# Patient Record
Sex: FEMALE | Race: WHITE | NOT HISPANIC OR LATINO | Employment: FULL TIME | URBAN - METROPOLITAN AREA
[De-identification: names, ages, dates, MRNs, and addresses within clinical notes are randomized per-mention and may not be internally consistent; named-entity substitution may affect disease eponyms.]

---

## 2023-03-29 LAB
ANTI-CENTROMERE: <0.2 AI
ANTI-CHROMATIN: <0.2 AI
ANTI-DNA (DS): 1 IU/ML
ANTI-JO-1 IGG: <0.2 AI
ANTI-NUCLEAR ANTIBODY (ANA): NEGATIVE
ANTI-RIBOSOMAL P: <0.2 AI
ANTI-RNP: <0.2 AI
ANTI-SCL-70: 0.3 AI
ANTI-SM/RNP: <0.2 AI
ANTI-SM: <0.2 AI
ANTI-SSA: <0.2 AI
ANTI-SSB: <0.2 AI

## 2024-01-08 ENCOUNTER — APPOINTMENT (OUTPATIENT)
Dept: PRIMARY CARE | Facility: CLINIC | Age: 69
End: 2024-01-08
Payer: COMMERCIAL

## 2024-01-29 ENCOUNTER — OFFICE VISIT (OUTPATIENT)
Dept: PRIMARY CARE | Facility: CLINIC | Age: 69
End: 2024-01-29
Payer: COMMERCIAL

## 2024-01-29 VITALS
BODY MASS INDEX: 22.2 KG/M2 | WEIGHT: 130 LBS | DIASTOLIC BLOOD PRESSURE: 66 MMHG | TEMPERATURE: 97.2 F | SYSTOLIC BLOOD PRESSURE: 153 MMHG | HEART RATE: 72 BPM | OXYGEN SATURATION: 99 % | HEIGHT: 64 IN

## 2024-01-29 DIAGNOSIS — F32.A ANXIETY AND DEPRESSION: ICD-10-CM

## 2024-01-29 DIAGNOSIS — I73.00 RAYNAUD'S DISEASE WITHOUT GANGRENE: ICD-10-CM

## 2024-01-29 DIAGNOSIS — J45.20 MILD INTERMITTENT ASTHMA WITHOUT COMPLICATION (HHS-HCC): ICD-10-CM

## 2024-01-29 DIAGNOSIS — J01.00 ACUTE NON-RECURRENT MAXILLARY SINUSITIS: ICD-10-CM

## 2024-01-29 DIAGNOSIS — F41.9 ANXIETY AND DEPRESSION: ICD-10-CM

## 2024-01-29 DIAGNOSIS — R05.2 SUBACUTE COUGH: Primary | ICD-10-CM

## 2024-01-29 DIAGNOSIS — I10 HTN (HYPERTENSION), BENIGN: ICD-10-CM

## 2024-01-29 DIAGNOSIS — E55.9 VITAMIN D DEFICIENCY: ICD-10-CM

## 2024-01-29 PROCEDURE — 1126F AMNT PAIN NOTED NONE PRSNT: CPT | Performed by: FAMILY MEDICINE

## 2024-01-29 PROCEDURE — 3077F SYST BP >= 140 MM HG: CPT | Performed by: FAMILY MEDICINE

## 2024-01-29 PROCEDURE — 1159F MED LIST DOCD IN RCRD: CPT | Performed by: FAMILY MEDICINE

## 2024-01-29 PROCEDURE — 3078F DIAST BP <80 MM HG: CPT | Performed by: FAMILY MEDICINE

## 2024-01-29 PROCEDURE — 99212 OFFICE O/P EST SF 10 MIN: CPT | Performed by: FAMILY MEDICINE

## 2024-01-29 PROCEDURE — 1036F TOBACCO NON-USER: CPT | Performed by: FAMILY MEDICINE

## 2024-01-29 RX ORDER — CHLORHEXIDINE GLUCONATE ORAL RINSE 1.2 MG/ML
SOLUTION DENTAL
COMMUNITY
Start: 2023-06-06 | End: 2024-01-29 | Stop reason: WASHOUT

## 2024-01-29 RX ORDER — TRAZODONE HYDROCHLORIDE 50 MG/1
TABLET ORAL
COMMUNITY
End: 2024-01-29 | Stop reason: WASHOUT

## 2024-01-29 RX ORDER — ELECTROLYTES/DEXTROSE
5 SOLUTION, ORAL ORAL DAILY
COMMUNITY
Start: 2016-02-22

## 2024-01-29 RX ORDER — NYSTATIN AND TRIAMCINOLONE ACETONIDE 100000; 1 [USP'U]/G; MG/G
OINTMENT TOPICAL
COMMUNITY
Start: 2022-09-20

## 2024-01-29 RX ORDER — ALBUTEROL SULFATE 90 UG/1
2 AEROSOL, METERED RESPIRATORY (INHALATION) EVERY 4 HOURS PRN
COMMUNITY
Start: 2023-03-24

## 2024-01-29 RX ORDER — AZELASTINE 1 MG/ML
1 SPRAY, METERED NASAL 2 TIMES DAILY
COMMUNITY
Start: 2021-09-01 | End: 2024-01-29 | Stop reason: WASHOUT

## 2024-01-29 RX ORDER — BENZONATATE 200 MG/1
200 CAPSULE ORAL 3 TIMES DAILY PRN
Qty: 90 CAPSULE | Refills: 0 | Status: SHIPPED | OUTPATIENT
Start: 2024-01-29 | End: 2024-02-28

## 2024-01-29 RX ORDER — FLUCONAZOLE 200 MG/1
200 TABLET ORAL
COMMUNITY

## 2024-01-29 RX ORDER — CELECOXIB 200 MG/1
CAPSULE ORAL
COMMUNITY
End: 2024-01-29 | Stop reason: WASHOUT

## 2024-01-29 RX ORDER — ACETAMINOPHEN 500 MG
5000 TABLET ORAL
COMMUNITY

## 2024-01-29 RX ORDER — DICLOFENAC SODIUM 10 MG/G
2 GEL TOPICAL EVERY 8 HOURS PRN
COMMUNITY
Start: 2022-05-23

## 2024-01-29 RX ORDER — FLUOROURACIL 50 MG/G
CREAM TOPICAL
COMMUNITY
Start: 2023-09-26

## 2024-01-29 RX ORDER — TRIAMCINOLONE ACETONIDE 1 MG/G
CREAM TOPICAL
COMMUNITY
Start: 2022-09-20 | End: 2024-01-29 | Stop reason: WASHOUT

## 2024-01-29 RX ORDER — AMOXICILLIN AND CLAVULANATE POTASSIUM 875; 125 MG/1; MG/1
875 TABLET, FILM COATED ORAL 2 TIMES DAILY
Qty: 20 TABLET | Refills: 0 | Status: SHIPPED | OUTPATIENT
Start: 2024-01-29 | End: 2024-02-08

## 2024-01-29 RX ORDER — ESCITALOPRAM OXALATE 5 MG/1
5 TABLET ORAL DAILY
COMMUNITY

## 2024-01-29 RX ORDER — CLOTRIMAZOLE 10 MG/1
LOZENGE ORAL; TOPICAL
COMMUNITY
End: 2024-01-29 | Stop reason: WASHOUT

## 2024-01-29 ASSESSMENT — PATIENT HEALTH QUESTIONNAIRE - PHQ9
1. LITTLE INTEREST OR PLEASURE IN DOING THINGS: NOT AT ALL
2. FEELING DOWN, DEPRESSED OR HOPELESS: NOT AT ALL
SUM OF ALL RESPONSES TO PHQ9 QUESTIONS 1 AND 2: 0

## 2024-01-29 NOTE — PROGRESS NOTES
Patient is here still with some cough and drainage from sinus pressure.  As she originally had strep and the flu a and then got COVID.  The last was the flu a about 2 weeks ago but she still been having some drainage goes away sometimes with some cough medicine and some cough drops but still keeps draining.    REVIEW OF SYSTEMS: 12 systems negative except for those mentioned in the HPI.    PHYSICAL EXAMINATION:   Constitutional: The patient is alert, in no acute  distress.  Eyes: Extraocular movements are intact. Pupils are equal and reactive to light  ENT: Edema and pain in V2 on the left with boggy nasal turbinates.  Trace fluid in the TMs  Neck: Supple without lymphadenopathy or JVD.  Heart: Regular rate and rhythm without murmur, click, gallop, or rub.  Lungs: Clear to auscultation bilaterally. No rales, rhonchi, or  wheezing.  Psychiatric: Good judgment and insight. Normal affect and mood.  Lymphatic: as noted above.  Skin: no rashes or lesions    Assessment:  per EMR    Plan:  Will switch over to Augmentin.  Can also use Delsym and Tessalon Perles.  Can follow-up if not improved  This dictation was created using Dragon dictation and may contain errors

## 2024-03-21 ENCOUNTER — OFFICE VISIT (OUTPATIENT)
Dept: PRIMARY CARE | Facility: CLINIC | Age: 69
End: 2024-03-21
Payer: COMMERCIAL

## 2024-03-21 VITALS
SYSTOLIC BLOOD PRESSURE: 142 MMHG | OXYGEN SATURATION: 99 % | DIASTOLIC BLOOD PRESSURE: 82 MMHG | HEIGHT: 64 IN | TEMPERATURE: 97.3 F | WEIGHT: 127 LBS | HEART RATE: 67 BPM | BODY MASS INDEX: 21.68 KG/M2

## 2024-03-21 DIAGNOSIS — F41.9 ANXIETY AND DEPRESSION: ICD-10-CM

## 2024-03-21 DIAGNOSIS — E55.9 VITAMIN D DEFICIENCY: ICD-10-CM

## 2024-03-21 DIAGNOSIS — J45.20 MILD INTERMITTENT ASTHMA WITHOUT COMPLICATION (HHS-HCC): ICD-10-CM

## 2024-03-21 DIAGNOSIS — Z00.00 MEDICARE ANNUAL WELLNESS VISIT, SUBSEQUENT: Primary | ICD-10-CM

## 2024-03-21 DIAGNOSIS — I73.00 RAYNAUD'S DISEASE WITHOUT GANGRENE: ICD-10-CM

## 2024-03-21 DIAGNOSIS — F32.A ANXIETY AND DEPRESSION: ICD-10-CM

## 2024-03-21 DIAGNOSIS — I10 HTN (HYPERTENSION), BENIGN: ICD-10-CM

## 2024-03-21 DIAGNOSIS — R73.9 HYPERGLYCEMIA: ICD-10-CM

## 2024-03-21 DIAGNOSIS — R74.8 ELEVATED LIVER ENZYMES: ICD-10-CM

## 2024-03-21 PROCEDURE — 3077F SYST BP >= 140 MM HG: CPT | Performed by: FAMILY MEDICINE

## 2024-03-21 PROCEDURE — G0439 PPPS, SUBSEQ VISIT: HCPCS | Performed by: FAMILY MEDICINE

## 2024-03-21 PROCEDURE — 1036F TOBACCO NON-USER: CPT | Performed by: FAMILY MEDICINE

## 2024-03-21 PROCEDURE — 1159F MED LIST DOCD IN RCRD: CPT | Performed by: FAMILY MEDICINE

## 2024-03-21 PROCEDURE — G0444 DEPRESSION SCREEN ANNUAL: HCPCS | Performed by: FAMILY MEDICINE

## 2024-03-21 PROCEDURE — 1123F ACP DISCUSS/DSCN MKR DOCD: CPT | Performed by: FAMILY MEDICINE

## 2024-03-21 PROCEDURE — G0296 VISIT TO DETERM LDCT ELIG: HCPCS | Performed by: FAMILY MEDICINE

## 2024-03-21 PROCEDURE — 3079F DIAST BP 80-89 MM HG: CPT | Performed by: FAMILY MEDICINE

## 2024-03-21 PROCEDURE — 1158F ADVNC CARE PLAN TLK DOCD: CPT | Performed by: FAMILY MEDICINE

## 2024-03-21 PROCEDURE — G0446 INTENS BEHAVE THER CARDIO DX: HCPCS | Performed by: FAMILY MEDICINE

## 2024-03-21 PROCEDURE — 99497 ADVNCD CARE PLAN 30 MIN: CPT | Performed by: FAMILY MEDICINE

## 2024-03-21 RX ORDER — CELECOXIB 200 MG/1
200 CAPSULE ORAL
COMMUNITY
Start: 2024-02-02

## 2024-03-21 RX ORDER — METFORMIN HYDROCHLORIDE 500 MG/1
TABLET ORAL
COMMUNITY
Start: 2018-04-19 | End: 2024-03-21 | Stop reason: WASHOUT

## 2024-03-21 ASSESSMENT — PATIENT HEALTH QUESTIONNAIRE - PHQ9
1. LITTLE INTEREST OR PLEASURE IN DOING THINGS: NOT AT ALL
SUM OF ALL RESPONSES TO PHQ9 QUESTIONS 1 AND 2: 0
2. FEELING DOWN, DEPRESSED OR HOPELESS: NOT AT ALL

## 2024-03-21 ASSESSMENT — ENCOUNTER SYMPTOMS
LOSS OF SENSATION IN FEET: 0
OCCASIONAL FEELINGS OF UNSTEADINESS: 0

## 2024-03-21 NOTE — PROGRESS NOTES
Advance Care Planning Note     Discussion Date: 03/21/24   Discussion Participants: patient    The patient wishes to discuss Advance Care Planning today and the following is a brief summary of our discussion.     Patient has capacity to make their own medical decisions: Yes  Health Care Agent/Surrogate Decision Maker documented in chart: Yes    Documents on file and valid:  Advance Directive/Living Will: Yes   Health Care Power of : Yes  Other: daughter    Communication of Medical Status/Prognosis:   na     Communication of Treatment Goals/Options:   na     Treatment Decisions  Goals of Care: survival is paramount regardless of prognosis, treatment outcome, or burden   duaghter  Follow Up Plan  na  Team Members  na  Time Statement: Total face to face time spent on advance care planning was >15 minutes with 16 minutes spent in counseling, including the explanation.    Chintan SUMNER Doloreshortensia, DO  3/21/2024 8:33 AMPatient was identified as a fall risk. Risk prevention instructions provided.  Patient is here for annual.  She has been doing well currently with no issues.  She has seen a dermatologist as well as also someone for the depression and has been doing well.  She has not had calcium score test and did smoke for more than 20 years.  Her ASCVD risk score is 9.2% she did have a heart cath 3 years ago for concerning EKG.  Otherwise is doing well.    REVIEW OF SYSTEMS: 12 systems negative except for those mentioned in the HPI. Reviewed home risks with patient. Patient feels safe at home.    PHYSICAL EXAMINATION:   Constitutional: The patient is alert and oriented x3, in no acute  distress.  Eyes: Extraocular movements are intact. Pupils are equal and reactive to light  ENT: Bilateral TMs within normal limits. Nasal turbinates are within normal limits. Throat within normal limits.  Neck: Supple without lymphadenopathy or JVD.  Heart: Regular rate and rhythm without murmur, click, gallop, or rub.  Lungs: Clear to  auscultation bilaterally. No rales, rhonchi, or  wheezing.  Abdomen: Soft, nontender, nondistended. Normoactive bowel sounds.   No palpable masses. Normal percussion  Musculoskeletal: 5/5 motor, upper and lower extremities.  Neurologic: Cranial nerves II through XII fully intact. +2/4 DTRs  in ankle and knee.  Psychiatric: Good judgment and insight. Normal affect and mood. No cognitive defects noted.  Lymphatic: Negative as noted above.  Skin: no rashes or lesions    Assessment:  Per EMR    Plan:  Cardiac screening as well as lung screening with ASCVD risk score discussed above 9.2% she does qualify for calcium score test for both of these.  PHQ-9 is reviewed.  Otherwise patient is doing excellent we will follow-up in 1 year  Discussed with the patient and reviewed annual wellness questionnaire form as well as cognitive deficits. Also discussed with the patient immunizations and risks for colonoscopy and other screening procedures    This dictation was created using Dragon dictation and may contain errors

## 2024-03-23 PROBLEM — M53.3 SACROILIAC JOINT DYSFUNCTION OF RIGHT SIDE: Status: RESOLVED | Noted: 2020-05-04 | Resolved: 2024-03-23

## 2024-03-23 PROBLEM — N83.8 OVARIAN MASS, RIGHT: Status: RESOLVED | Noted: 2024-03-23 | Resolved: 2024-03-23

## 2024-03-23 PROBLEM — Z00.00 MEDICARE ANNUAL WELLNESS VISIT, SUBSEQUENT: Status: RESOLVED | Noted: 2024-03-21 | Resolved: 2024-03-23

## 2024-03-23 PROBLEM — G43.109 OCULAR MIGRAINE: Status: ACTIVE | Noted: 2024-03-23

## 2024-03-23 PROBLEM — J30.9 ALLERGIC RHINITIS: Status: ACTIVE | Noted: 2024-03-23

## 2024-03-23 PROBLEM — J01.00 ACUTE NON-RECURRENT MAXILLARY SINUSITIS: Status: RESOLVED | Noted: 2024-01-29 | Resolved: 2024-03-23

## 2024-03-23 PROBLEM — J45.909 REACTIVE AIRWAY DISEASE (HHS-HCC): Status: ACTIVE | Noted: 2019-09-25

## 2024-03-23 PROBLEM — L43.8 ORAL LICHEN PLANUS: Status: ACTIVE | Noted: 2024-03-23

## 2024-03-23 PROBLEM — E78.49 FAMILIAL HYPERLIPIDEMIA: Status: ACTIVE | Noted: 2024-03-23

## 2024-03-23 PROBLEM — N39.3 SUI (STRESS URINARY INCONTINENCE, FEMALE): Status: ACTIVE | Noted: 2024-03-13

## 2024-03-23 PROBLEM — R05.2 SUBACUTE COUGH: Status: RESOLVED | Noted: 2024-01-29 | Resolved: 2024-03-23

## 2024-03-25 ENCOUNTER — OFFICE VISIT (OUTPATIENT)
Dept: RHEUMATOLOGY | Facility: CLINIC | Age: 69
End: 2024-03-25
Payer: COMMERCIAL

## 2024-03-25 VITALS
OXYGEN SATURATION: 99 % | SYSTOLIC BLOOD PRESSURE: 139 MMHG | WEIGHT: 126.2 LBS | HEIGHT: 63 IN | BODY MASS INDEX: 22.36 KG/M2 | TEMPERATURE: 97.3 F | HEART RATE: 66 BPM | DIASTOLIC BLOOD PRESSURE: 59 MMHG

## 2024-03-25 DIAGNOSIS — I73.00 RAYNAUD'S DISEASE WITHOUT GANGRENE: Primary | ICD-10-CM

## 2024-03-25 DIAGNOSIS — Z23 ENCOUNTER FOR IMMUNIZATION: ICD-10-CM

## 2024-03-25 PROCEDURE — 90750 HZV VACC RECOMBINANT IM: CPT | Performed by: INTERNAL MEDICINE

## 2024-03-25 PROCEDURE — 3075F SYST BP GE 130 - 139MM HG: CPT | Performed by: INTERNAL MEDICINE

## 2024-03-25 PROCEDURE — 90471 IMMUNIZATION ADMIN: CPT | Performed by: INTERNAL MEDICINE

## 2024-03-25 PROCEDURE — 99214 OFFICE O/P EST MOD 30 MIN: CPT | Performed by: INTERNAL MEDICINE

## 2024-03-25 PROCEDURE — 1159F MED LIST DOCD IN RCRD: CPT | Performed by: INTERNAL MEDICINE

## 2024-03-25 PROCEDURE — 1036F TOBACCO NON-USER: CPT | Performed by: INTERNAL MEDICINE

## 2024-03-25 PROCEDURE — 3078F DIAST BP <80 MM HG: CPT | Performed by: INTERNAL MEDICINE

## 2024-03-25 ASSESSMENT — PATIENT HEALTH QUESTIONNAIRE - PHQ9
2. FEELING DOWN, DEPRESSED OR HOPELESS: NOT AT ALL
SUM OF ALL RESPONSES TO PHQ9 QUESTIONS 1 & 2: 0
1. LITTLE INTEREST OR PLEASURE IN DOING THINGS: NOT AT ALL

## 2024-03-25 ASSESSMENT — ENCOUNTER SYMPTOMS
SHORTNESS OF BREATH: 0
MYALGIAS: 0
JOINT SWELLING: 0
NUMBNESS: 0
COLOR CHANGE: 1
FATIGUE: 1
WEAKNESS: 0
FEVER: 0
BACK PAIN: 0
COUGH: 0
ARTHRALGIAS: 0

## 2024-03-25 NOTE — ASSESSMENT & PLAN NOTE
No signs of a secondary process on exam.   AMOL ordered.   Pt to continue OTC measures to control her symptoms.  Pt at low risk of transitioning to another autoimmune disease

## 2024-03-25 NOTE — LETTER
March 25, 2024     Chintan Jorge DO  [address]  [facility] [address]    Patient: Macie Groves   YOB: 1955   Date of Visit: 3/25/2024       Dear Dr. Chintan Jorge DO:    Thank you for referring Macie Groves to me for evaluation. Below are my notes for this consultation.  If you have questions, please do not hesitate to call me. I look forward to following your patient along with you.       Sincerely,     Elysia Alaniz MD      CC: No Recipients  ______________________________________________________________________________________    RHEUMATOLOGY/PRIMARY CARE PROGRESS NOTE  Macie Groves 68 y.o. female  Chief Complaint   Patient presents with   • Follow-up     Raynauds       SUBJECTIVE  Re:  Raynaud's  Pt reports she has been stable.   Mild attacks but nothing severe and nothing prolonged.  Not noticing any new symptoms--no worsening joint pain, no rashes.    Since last seen, has had multiple infections--strep, flu and COVID.  Has gotten oral yeast infections on and off since  Reports she did have an irregular heart rate after a COVID booster.  Had a heart cath and was ok  (chart reviewed--this happened in 2021)     Pt is interested in getting shingles vaccination today      Patient Active Problem List    Diagnosis Date Noted   • Allergic rhinitis 03/23/2024   • Oral lichen planus 03/23/2024   • Ocular migraine 03/23/2024   • Familial hyperlipidemia 03/23/2024   • Elevated alkaline phosphatase level 03/21/2024   • Hyperglycemia 03/21/2024   • JESE (stress urinary incontinence, female) 03/13/2024   • HTN (hypertension), benign 01/29/2024   • Anxiety and depression 01/29/2024   • Raynaud's disease without gangrene 01/29/2024   • Vitamin D deficiency 01/29/2024   • Mild intermittent asthma without complication 01/29/2024   • Reactive airway disease 09/25/2019     Past Medical History:   Diagnosis Date   • Acute non-recurrent maxillary sinusitis 01/29/2024   • Allergy,  "unspecified, initial encounter 06/21/2022    Allergic reaction, initial encounter   • Caffeine use    • Other allergic rhinitis 01/17/2020    Non-seasonal allergic rhinitis, unspecified trigger   • Ovarian mass, right 03/23/2024   • Partial intestinal obstruction, unspecified as to cause (CMS/HCC)     Partial small bowel obstruction   • Sacroiliac joint dysfunction of right side 05/04/2020   • Subacute cough 01/29/2024     Current Outpatient Medications   Medication Instructions   • albuterol 90 mcg/actuation inhaler 2 puffs, inhalation, Every 4 hours PRN   • biotin 5 mg capsule 1 capsule (5 mg) once daily.   • celecoxib (CeleBREX) 200 mg capsule 1 capsule (200 mg). PRN   • cholecalciferol (VITAMIN D-3) 5,000 Units, oral, Daily RT   • diclofenac sodium (VOLTAREN) 2 g, Topical, Every 8 hours PRN   • escitalopram (LEXAPRO) 5 mg, oral, Daily   • fluconazole (DIFLUCAN) 200 mg, oral, 2x a week    • fluorouracil (Efudex) 5 % cream PRN   • nystatin-triamcinolone (Mycolog II) ointment PRN     Allergies   Allergen Reactions   • Ciprofloxacin Other     Dizziness   • Ezetimibe Other     Generic Zetia causes muscle aches   • Prednisone Hives   • Methocarbamol Itching and Rash     Review of Systems   Constitutional:  Positive for fatigue. Negative for fever.   Respiratory:  Negative for cough and shortness of breath.    Cardiovascular:  Negative for leg swelling.   Musculoskeletal:  Negative for arthralgias, back pain, gait problem, joint swelling and myalgias.   Skin:  Positive for color change. Negative for rash.   Neurological:  Negative for weakness and numbness.       PHYSICAL EXAM  /59   Pulse 66   Temp 36.3 °C (97.3 °F) (Temporal)   Ht 1.6 m (5' 3\")   Wt 57.2 kg (126 lb 3.2 oz)   SpO2 99%   BMI 22.36 kg/m²   Physical Exam  Vitals reviewed.   Constitutional:       General: She is not in acute distress.     Appearance: Normal appearance.   HENT:      Head: Normocephalic and atraumatic.   Eyes:      " Conjunctiva/sclera: Conjunctivae normal.   Cardiovascular:      Rate and Rhythm: Normal rate and regular rhythm.      Pulses: Normal pulses.      Heart sounds: Normal heart sounds. No murmur heard.     No friction rub. No gallop.   Pulmonary:      Effort: Pulmonary effort is normal. No respiratory distress.   Musculoskeletal:         General: No swelling, tenderness or deformity.      Cervical back: Normal range of motion and neck supple.      Right lower leg: No edema.      Left lower leg: No edema.      Comments: No synovitis of examined joints   Skin:     General: Skin is dry.      Coloration: Skin is not pale.      Findings: No erythema or rash.      Comments: No Raynaud's attacks   Neurological:      General: No focal deficit present.      Mental Status: She is alert and oriented to person, place, and time. Mental status is at baseline.      Gait: Gait normal.   Psychiatric:         Mood and Affect: Mood normal.       Lab Results   Component Value Date    WBC 6.3 10/17/2021    HGB 13.9 10/17/2021    HCT 42.2 10/17/2021    MCV 95 10/17/2021     10/17/2021     Lab Results   Component Value Date    GLUCOSE 97 08/10/2022    CALCIUM 9.0 08/10/2022     08/10/2022    K 4.7 08/10/2022    CO2 28 08/10/2022     08/10/2022    BUN 15 08/10/2022    CREATININE 0.68 08/10/2022     Lab Results   Component Value Date    ALT 14 08/10/2022    AST 22 08/10/2022    ALKPHOS 79 08/10/2022    BILITOT 0.6 08/10/2022         Assessment/plan  Problem List Items Addressed This Visit       Raynaud's disease without gangrene - Primary    Current Assessment & Plan     No signs of a secondary process on exam.   AMOL ordered.   Pt to continue OTC measures to control her symptoms.  Pt at low risk of transitioning to another autoimmune disease         Relevant Orders    AMOL with Reflex to NATHALIE     Other Visit Diagnoses       Encounter for immunization        Relevant Orders    Zoster vaccine, recombinant, adult (SHINGRIX)         Vaccinations discussed.  Pt to proceed with zoster vaccination today    Follow up: ___12__months

## 2024-03-25 NOTE — PROGRESS NOTES
RHEUMATOLOGY/PRIMARY CARE PROGRESS NOTE  Mcaie Luzsunny 68 y.o. female  Chief Complaint   Patient presents with    Follow-up     Raynauds       SUBJECTIVE  Re:  Raynaud's  Pt reports she has been stable.   Mild attacks but nothing severe and nothing prolonged.  Not noticing any new symptoms--no worsening joint pain, no rashes.    Since last seen, has had multiple infections--strep, flu and COVID.  Has gotten oral yeast infections on and off since  Reports she did have an irregular heart rate after a COVID booster.  Had a heart cath and was ok  (chart reviewed--this happened in 2021)     Pt is interested in getting shingles vaccination today      Patient Active Problem List    Diagnosis Date Noted    Allergic rhinitis 03/23/2024    Oral lichen planus 03/23/2024    Ocular migraine 03/23/2024    Familial hyperlipidemia 03/23/2024    Elevated alkaline phosphatase level 03/21/2024    Hyperglycemia 03/21/2024    JESE (stress urinary incontinence, female) 03/13/2024    HTN (hypertension), benign 01/29/2024    Anxiety and depression 01/29/2024    Raynaud's disease without gangrene 01/29/2024    Vitamin D deficiency 01/29/2024    Mild intermittent asthma without complication 01/29/2024    Reactive airway disease 09/25/2019     Past Medical History:   Diagnosis Date    Acute non-recurrent maxillary sinusitis 01/29/2024    Allergy, unspecified, initial encounter 06/21/2022    Allergic reaction, initial encounter    Caffeine use     Other allergic rhinitis 01/17/2020    Non-seasonal allergic rhinitis, unspecified trigger    Ovarian mass, right 03/23/2024    Partial intestinal obstruction, unspecified as to cause (CMS/[facility])     Partial small bowel obstruction    Sacroiliac joint dysfunction of right side 05/04/2020    Subacute cough 01/29/2024     Current Outpatient Medications   Medication Instructions    albuterol 90 mcg/actuation inhaler 2 puffs, inhalation, Every 4 hours PRN    biotin 5 mg capsule 1 capsule (5 mg) once  "daily.    celecoxib (CeleBREX) 200 mg capsule 1 capsule (200 mg). PRN    cholecalciferol (VITAMIN D-3) 5,000 Units, oral, Daily RT    diclofenac sodium (VOLTAREN) 2 g, Topical, Every 8 hours PRN    escitalopram (LEXAPRO) 5 mg, oral, Daily    fluconazole (DIFLUCAN) 200 mg, oral, 2x a week     fluorouracil (Efudex) 5 % cream PRN    nystatin-triamcinolone (Mycolog II) ointment PRN     Allergies   Allergen Reactions    Ciprofloxacin Other     Dizziness    Ezetimibe Other     Generic Zetia causes muscle aches    Prednisone Hives    Methocarbamol Itching and Rash     Review of Systems   Constitutional:  Positive for fatigue. Negative for fever.   Respiratory:  Negative for cough and shortness of breath.    Cardiovascular:  Negative for leg swelling.   Musculoskeletal:  Negative for arthralgias, back pain, gait problem, joint swelling and myalgias.   Skin:  Positive for color change. Negative for rash.   Neurological:  Negative for weakness and numbness.       PHYSICAL EXAM  /59   Pulse 66   Temp 36.3 °C (97.3 °F) (Temporal)   Ht 1.6 m (5' 3\")   Wt 57.2 kg (126 lb 3.2 oz)   SpO2 99%   BMI 22.36 kg/m²   Physical Exam  Vitals reviewed.   Constitutional:       General: She is not in acute distress.     Appearance: Normal appearance.   HENT:      Head: Normocephalic and atraumatic.   Eyes:      Conjunctiva/sclera: Conjunctivae normal.   Cardiovascular:      Rate and Rhythm: Normal rate and regular rhythm.      Pulses: Normal pulses.      Heart sounds: Normal heart sounds. No murmur heard.     No friction rub. No gallop.   Pulmonary:      Effort: Pulmonary effort is normal. No respiratory distress.   Musculoskeletal:         General: No swelling, tenderness or deformity.      Cervical back: Normal range of motion and neck supple.      Right lower leg: No edema.      Left lower leg: No edema.      Comments: No synovitis of examined joints   Skin:     General: Skin is dry.      Coloration: Skin is not pale.      " Findings: No erythema or rash.      Comments: No Raynaud's attacks   Neurological:      General: No focal deficit present.      Mental Status: She is alert and oriented to person, place, and time. Mental status is at baseline.      Gait: Gait normal.   Psychiatric:         Mood and Affect: Mood normal.       Lab Results   Component Value Date    WBC 6.3 10/17/2021    HGB 13.9 10/17/2021    HCT 42.2 10/17/2021    MCV 95 10/17/2021     10/17/2021     Lab Results   Component Value Date    GLUCOSE 97 08/10/2022    CALCIUM 9.0 08/10/2022     08/10/2022    K 4.7 08/10/2022    CO2 28 08/10/2022     08/10/2022    BUN 15 08/10/2022    CREATININE 0.68 08/10/2022     Lab Results   Component Value Date    ALT 14 08/10/2022    AST 22 08/10/2022    ALKPHOS 79 08/10/2022    BILITOT 0.6 08/10/2022         Assessment/plan  Problem List Items Addressed This Visit       Raynaud's disease without gangrene - Primary    Current Assessment & Plan     No signs of a secondary process on exam.   AMOL ordered.   Pt to continue OTC measures to control her symptoms.  Pt at low risk of transitioning to another autoimmune disease         Relevant Orders    AMOL with Reflex to NATHALIE     Other Visit Diagnoses       Encounter for immunization        Relevant Orders    Zoster vaccine, recombinant, adult (SHINGRIX)        Vaccinations discussed.  Pt to proceed with zoster vaccination today    Follow up: ___12__months

## 2024-03-25 NOTE — PATIENT INSTRUCTIONS
It was a pleasure to see you today  Please call if your symptoms worsen  Please review your summary for education and reminders.  Follow up at your next appointment.    If you had labs/xrays done today, you will be able to view on [employer].   We will contact you when the results are reviewed for further discussion.  Please note that you may receive your results before I have had a chance to review.  Please know I will be contacting you for discussion  Homegoing instructions for all patient  A healthy lifestyle helps chronic diseases  These are all the goals you should strive to improve your overall health   Blood pressure <130/85   BMI of <30 or waist circumference that is 1/2 of your height   Fasting blood sugar <107 (if you are diabetic, aim for an A1c <6.4%_   LDL cholesterol <130   Avoid smoking   Manage your stress   Get your preventive exams   Get your immunizations

## 2024-03-29 ENCOUNTER — LAB (OUTPATIENT)
Dept: LAB | Facility: LAB | Age: 69
End: 2024-03-29
Payer: COMMERCIAL

## 2024-03-29 DIAGNOSIS — I10 HTN (HYPERTENSION), BENIGN: ICD-10-CM

## 2024-03-29 DIAGNOSIS — E55.9 VITAMIN D DEFICIENCY: ICD-10-CM

## 2024-03-29 DIAGNOSIS — R73.9 HYPERGLYCEMIA: ICD-10-CM

## 2024-03-29 DIAGNOSIS — R74.8 ELEVATED LIVER ENZYMES: ICD-10-CM

## 2024-03-29 DIAGNOSIS — F32.A ANXIETY AND DEPRESSION: ICD-10-CM

## 2024-03-29 DIAGNOSIS — I73.00 RAYNAUD'S DISEASE WITHOUT GANGRENE: ICD-10-CM

## 2024-03-29 DIAGNOSIS — Z00.00 MEDICARE ANNUAL WELLNESS VISIT, SUBSEQUENT: ICD-10-CM

## 2024-03-29 DIAGNOSIS — F41.9 ANXIETY AND DEPRESSION: ICD-10-CM

## 2024-03-29 DIAGNOSIS — J45.20 MILD INTERMITTENT ASTHMA WITHOUT COMPLICATION (HHS-HCC): ICD-10-CM

## 2024-03-29 LAB
25(OH)D3 SERPL-MCNC: 87 NG/ML (ref 30–100)
ALBUMIN SERPL BCP-MCNC: 3.9 G/DL (ref 3.4–5)
ALP SERPL-CCNC: 95 U/L (ref 33–136)
ALT SERPL W P-5'-P-CCNC: 11 U/L (ref 7–45)
AMYLASE SERPL-CCNC: 47 U/L (ref 29–103)
ANION GAP SERPL CALC-SCNC: 10 MMOL/L (ref 10–20)
AST SERPL W P-5'-P-CCNC: 21 U/L (ref 9–39)
BASOPHILS # BLD AUTO: 0.06 X10*3/UL (ref 0–0.1)
BASOPHILS NFR BLD AUTO: 1.4 %
BILIRUB SERPL-MCNC: 0.4 MG/DL (ref 0–1.2)
BUN SERPL-MCNC: 14 MG/DL (ref 6–23)
CALCIUM SERPL-MCNC: 9 MG/DL (ref 8.6–10.3)
CHLORIDE SERPL-SCNC: 102 MMOL/L (ref 98–107)
CHOLEST SERPL-MCNC: 225 MG/DL (ref 0–199)
CHOLESTEROL/HDL RATIO: 3.4
CO2 SERPL-SCNC: 30 MMOL/L (ref 21–32)
CREAT SERPL-MCNC: 0.7 MG/DL (ref 0.5–1.05)
EGFRCR SERPLBLD CKD-EPI 2021: >90 ML/MIN/1.73M*2
EOSINOPHIL # BLD AUTO: 0.09 X10*3/UL (ref 0–0.7)
EOSINOPHIL NFR BLD AUTO: 2.1 %
ERYTHROCYTE [DISTWIDTH] IN BLOOD BY AUTOMATED COUNT: 13.1 % (ref 11.5–14.5)
EST. AVERAGE GLUCOSE BLD GHB EST-MCNC: 94 MG/DL
GLUCOSE SERPL-MCNC: 86 MG/DL (ref 74–99)
HBA1C MFR BLD: 4.9 %
HCT VFR BLD AUTO: 41.1 % (ref 36–46)
HDLC SERPL-MCNC: 65.6 MG/DL
HGB BLD-MCNC: 13.4 G/DL (ref 12–16)
IMM GRANULOCYTES # BLD AUTO: 0.01 X10*3/UL (ref 0–0.7)
IMM GRANULOCYTES NFR BLD AUTO: 0.2 % (ref 0–0.9)
LDLC SERPL CALC-MCNC: 143 MG/DL
LIPASE SERPL-CCNC: 179 U/L (ref 9–82)
LYMPHOCYTES # BLD AUTO: 1.43 X10*3/UL (ref 1.2–4.8)
LYMPHOCYTES NFR BLD AUTO: 32.6 %
MCH RBC QN AUTO: 31.6 PG (ref 26–34)
MCHC RBC AUTO-ENTMCNC: 32.6 G/DL (ref 32–36)
MCV RBC AUTO: 97 FL (ref 80–100)
MONOCYTES # BLD AUTO: 0.46 X10*3/UL (ref 0.1–1)
MONOCYTES NFR BLD AUTO: 10.5 %
NEUTROPHILS # BLD AUTO: 2.34 X10*3/UL (ref 1.2–7.7)
NEUTROPHILS NFR BLD AUTO: 53.2 %
NON HDL CHOLESTEROL: 159 MG/DL (ref 0–149)
NRBC BLD-RTO: 0 /100 WBCS (ref 0–0)
PLATELET # BLD AUTO: 260 X10*3/UL (ref 150–450)
POTASSIUM SERPL-SCNC: 4.3 MMOL/L (ref 3.5–5.3)
PROT SERPL-MCNC: 7.2 G/DL (ref 6.4–8.2)
RBC # BLD AUTO: 4.24 X10*6/UL (ref 4–5.2)
SODIUM SERPL-SCNC: 138 MMOL/L (ref 136–145)
TRIGL SERPL-MCNC: 80 MG/DL (ref 0–149)
TSH SERPL-ACNC: 1.53 MIU/L (ref 0.44–3.98)
VLDL: 16 MG/DL (ref 0–40)
WBC # BLD AUTO: 4.4 X10*3/UL (ref 4.4–11.3)

## 2024-03-29 PROCEDURE — 36415 COLL VENOUS BLD VENIPUNCTURE: CPT

## 2024-03-29 PROCEDURE — 86038 ANTINUCLEAR ANTIBODIES: CPT

## 2024-03-29 PROCEDURE — 83690 ASSAY OF LIPASE: CPT

## 2024-03-29 PROCEDURE — 84443 ASSAY THYROID STIM HORMONE: CPT

## 2024-03-29 PROCEDURE — 80061 LIPID PANEL: CPT

## 2024-03-29 PROCEDURE — 85025 COMPLETE CBC W/AUTO DIFF WBC: CPT

## 2024-03-29 PROCEDURE — 82150 ASSAY OF AMYLASE: CPT

## 2024-03-29 PROCEDURE — 80053 COMPREHEN METABOLIC PANEL: CPT

## 2024-03-29 PROCEDURE — 83036 HEMOGLOBIN GLYCOSYLATED A1C: CPT

## 2024-03-29 PROCEDURE — 82306 VITAMIN D 25 HYDROXY: CPT

## 2024-04-01 LAB — ANA SER QL HEP2 SUBST: NEGATIVE

## 2024-04-18 ENCOUNTER — TELEPHONE (OUTPATIENT)
Dept: PRIMARY CARE | Facility: CLINIC | Age: 69
End: 2024-04-18
Payer: COMMERCIAL

## 2024-04-18 NOTE — TELEPHONE ENCOUNTER
Pt called and said she was billed $165 for discussing end of life care with Dr Jorge. I think I remember hearing that there was a billing/coding issue with this.  She called billing and they instructed her to contact Dr Jorge regarding this

## 2024-07-01 ENCOUNTER — APPOINTMENT (OUTPATIENT)
Dept: PRIMARY CARE | Facility: CLINIC | Age: 69
End: 2024-07-01
Payer: COMMERCIAL

## 2024-07-01 DIAGNOSIS — R05.2 SUBACUTE COUGH: ICD-10-CM

## 2024-07-01 DIAGNOSIS — Z23 ENCOUNTER FOR IMMUNIZATION: Primary | ICD-10-CM

## 2024-07-01 PROCEDURE — 90750 HZV VACC RECOMBINANT IM: CPT | Performed by: INTERNAL MEDICINE

## 2024-07-01 PROCEDURE — 90471 IMMUNIZATION ADMIN: CPT | Performed by: INTERNAL MEDICINE

## 2024-09-04 ENCOUNTER — OFFICE VISIT (OUTPATIENT)
Dept: PRIMARY CARE | Facility: CLINIC | Age: 69
End: 2024-09-04
Payer: COMMERCIAL

## 2024-09-04 VITALS
DIASTOLIC BLOOD PRESSURE: 76 MMHG | HEIGHT: 63 IN | SYSTOLIC BLOOD PRESSURE: 135 MMHG | WEIGHT: 127 LBS | BODY MASS INDEX: 22.5 KG/M2 | HEART RATE: 64 BPM

## 2024-09-04 DIAGNOSIS — S46.002A INJURY OF LEFT ROTATOR CUFF, INITIAL ENCOUNTER: Primary | ICD-10-CM

## 2024-09-04 PROCEDURE — 3008F BODY MASS INDEX DOCD: CPT | Performed by: FAMILY MEDICINE

## 2024-09-04 PROCEDURE — 3075F SYST BP GE 130 - 139MM HG: CPT | Performed by: FAMILY MEDICINE

## 2024-09-04 PROCEDURE — 1124F ACP DISCUSS-NO DSCNMKR DOCD: CPT | Performed by: FAMILY MEDICINE

## 2024-09-04 PROCEDURE — 1159F MED LIST DOCD IN RCRD: CPT | Performed by: FAMILY MEDICINE

## 2024-09-04 PROCEDURE — 3078F DIAST BP <80 MM HG: CPT | Performed by: FAMILY MEDICINE

## 2024-09-04 PROCEDURE — 99213 OFFICE O/P EST LOW 20 MIN: CPT | Performed by: FAMILY MEDICINE

## 2024-09-04 RX ORDER — TRETINOIN 0.25 MG/G
GEL TOPICAL
COMMUNITY

## 2024-09-04 ASSESSMENT — ENCOUNTER SYMPTOMS
FEVER: 0
DIZZINESS: 0
SHORTNESS OF BREATH: 0
FATIGUE: 0
HEADACHES: 0
ACTIVITY CHANGE: 0

## 2024-09-04 ASSESSMENT — PATIENT HEALTH QUESTIONNAIRE - PHQ9
2. FEELING DOWN, DEPRESSED OR HOPELESS: NOT AT ALL
1. LITTLE INTEREST OR PLEASURE IN DOING THINGS: NOT AT ALL
SUM OF ALL RESPONSES TO PHQ9 QUESTIONS 1 AND 2: 0

## 2024-09-04 NOTE — PROGRESS NOTES
"Subjective   Patient ID: Macie Groves is a 68 y.o. female who presents for Consult (Left shoulder and arm pain since June. Hurts to sleep and put clothing on and for reach for items that are high).    Shoulder pain L   - reports she has been dealing with pain into her arm/shoulder for the last 3 months   - has been trying to do exercise to improve pain without significant benefit   - denies any significant weakness, numbness or tingling   - hurts worse when she lifts her arm up over her head behind her back or laying on her side   - denies any significant tenderness to palpation   - denies any significant trauma, fall, pop or traumatic event   - in the past she ha had R sided injury that required physical therapy for improvement          Review of Systems   Constitutional:  Negative for activity change, fatigue and fever.   Respiratory:  Negative for shortness of breath.    Cardiovascular:  Negative for chest pain.   Neurological:  Negative for dizziness and headaches.       Objective   /76   Pulse 64   Ht 1.6 m (5' 3\")   Wt 57.6 kg (127 lb)   BMI 22.50 kg/m²     Physical Exam  Constitutional:       Appearance: Normal appearance.   Musculoskeletal:      Comments: Reduced internal rotation, positive empty can sign, positive scarf sign    Neurological:      Mental Status: She is alert.         Assessment/Plan   Problem List Items Addressed This Visit    None  Visit Diagnoses         Codes    Injury of left rotator cuff, initial encounter    -  Primary S46.002A    stable   - continue celebrex 200mg daily   - physical therapy referral placed   - f/u 6 weeks     Relevant Orders    Referral to Physical Therapy               "

## 2024-09-04 NOTE — LETTER
To Whom It May Concern:     Ms. Macie Groves is a patient under my care for her chronic medical issues.  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  Her chronic pain issues would be exacerbated to the point that she would not be able to function properly in a court room setting.      If you have any further questions or concerns feel free to call my office.       Sincerely,         Dr. Alfa Ho

## 2024-09-04 NOTE — LETTER
September 4, 2024    Macie Groves  [address] [place] [address]      To Whom It May Concern:     Ms. Macie Groves is a patient under my care for her chronic medical issues.  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  Her chronic pain issues would be exacerbated to the point that she would not be able to function properly in a court room setting.      If you have any further questions or concerns feel free to call my office.       Sincerely,             Alfa Ho MD        CC: No Recipients

## 2024-09-09 ENCOUNTER — PATIENT MESSAGE (OUTPATIENT)
Dept: PRIMARY CARE | Facility: CLINIC | Age: 69
End: 2024-09-09
Payer: COMMERCIAL

## 2024-09-09 DIAGNOSIS — S46.002A INJURY OF LEFT ROTATOR CUFF, INITIAL ENCOUNTER: Primary | ICD-10-CM

## 2024-11-11 ENCOUNTER — APPOINTMENT (OUTPATIENT)
Dept: PRIMARY CARE | Facility: CLINIC | Age: 69
End: 2024-11-11
Payer: COMMERCIAL

## 2024-11-15 PROBLEM — M62.838 SPASM OF MUSCLE: Status: ACTIVE | Noted: 2024-04-10

## 2024-11-15 PROBLEM — E78.49 FAMILIAL COMBINED HYPERLIPIDEMIA: Status: ACTIVE | Noted: 2024-03-23

## 2024-11-18 ENCOUNTER — APPOINTMENT (OUTPATIENT)
Dept: PRIMARY CARE | Facility: CLINIC | Age: 69
End: 2024-11-18
Payer: COMMERCIAL

## 2024-11-18 VITALS
HEART RATE: 58 BPM | WEIGHT: 126 LBS | DIASTOLIC BLOOD PRESSURE: 80 MMHG | HEIGHT: 63 IN | SYSTOLIC BLOOD PRESSURE: 100 MMHG | BODY MASS INDEX: 22.32 KG/M2

## 2024-11-18 DIAGNOSIS — M25.512 CHRONIC LEFT SHOULDER PAIN: Primary | ICD-10-CM

## 2024-11-18 DIAGNOSIS — G89.29 CHRONIC LEFT SHOULDER PAIN: Primary | ICD-10-CM

## 2024-11-18 PROCEDURE — 90662 IIV NO PRSV INCREASED AG IM: CPT | Performed by: FAMILY MEDICINE

## 2024-11-18 PROCEDURE — 90471 IMMUNIZATION ADMIN: CPT | Performed by: FAMILY MEDICINE

## 2024-11-18 PROCEDURE — 99213 OFFICE O/P EST LOW 20 MIN: CPT | Performed by: FAMILY MEDICINE

## 2024-11-18 PROCEDURE — 3008F BODY MASS INDEX DOCD: CPT | Performed by: FAMILY MEDICINE

## 2024-11-18 PROCEDURE — 3074F SYST BP LT 130 MM HG: CPT | Performed by: FAMILY MEDICINE

## 2024-11-18 PROCEDURE — 1036F TOBACCO NON-USER: CPT | Performed by: FAMILY MEDICINE

## 2024-11-18 PROCEDURE — 3079F DIAST BP 80-89 MM HG: CPT | Performed by: FAMILY MEDICINE

## 2024-11-18 PROCEDURE — 1123F ACP DISCUSS/DSCN MKR DOCD: CPT | Performed by: FAMILY MEDICINE

## 2024-11-18 PROCEDURE — 1159F MED LIST DOCD IN RCRD: CPT | Performed by: FAMILY MEDICINE

## 2024-11-18 ASSESSMENT — ENCOUNTER SYMPTOMS
ACTIVITY CHANGE: 0
DIZZINESS: 0
FATIGUE: 0
SHORTNESS OF BREATH: 0
HEADACHES: 0
FEVER: 0

## 2024-11-18 NOTE — PROGRESS NOTES
"Subjective   Patient ID: Macie Groves is a 69 y.o. female who presents for Follow-up (Shoulder pt states it is better but not good pt requests a flu shot).    Shoulder pain   - reports she has been doing physical therapy for the last 8 weeks   - still having persistent pain in the L shoulder   - reports in the morning the pain is severe, and it improves as the day goes on   - reports pain can be sharp or dull   - range of motion is limited to abduction to 90 degrees   - no numbness, tingling or weakness   - has not had any traumatic episodes prior to this   - denies any popping, clicking or freezing in the shoulder        Review of Systems   Constitutional:  Negative for activity change, fatigue and fever.   Respiratory:  Negative for shortness of breath.    Cardiovascular:  Negative for chest pain.   Neurological:  Negative for dizziness and headaches.       Objective   /80   Pulse 58   Ht 1.6 m (5' 3\")   Wt 57.2 kg (126 lb)   BMI 22.32 kg/m²     Physical Exam  Constitutional:       Appearance: Normal appearance.   Cardiovascular:      Rate and Rhythm: Normal rate and regular rhythm.   Pulmonary:      Effort: Pulmonary effort is normal.      Breath sounds: Normal breath sounds.   Neurological:      Mental Status: She is alert.       Assessment/Plan   Problem List Items Addressed This Visit    None  Visit Diagnoses         Codes    Chronic left shoulder pain    -  Primary M25.512, G89.29    stable  - referral placed to ortho and pain management   - continue home PT exercises   - f/u with specialist     Relevant Orders    Referral to Orthopaedic Surgery    Referral to Pain Medicine               "

## 2025-03-24 ENCOUNTER — APPOINTMENT (OUTPATIENT)
Dept: PRIMARY CARE | Facility: CLINIC | Age: 70
End: 2025-03-24
Payer: COMMERCIAL

## 2025-03-25 ENCOUNTER — APPOINTMENT (OUTPATIENT)
Dept: PRIMARY CARE | Facility: CLINIC | Age: 70
End: 2025-03-25
Payer: COMMERCIAL

## 2025-03-25 VITALS
WEIGHT: 126 LBS | HEART RATE: 78 BPM | HEIGHT: 63 IN | BODY MASS INDEX: 22.32 KG/M2 | DIASTOLIC BLOOD PRESSURE: 62 MMHG | SYSTOLIC BLOOD PRESSURE: 130 MMHG

## 2025-03-25 DIAGNOSIS — Z00.00 ROUTINE GENERAL MEDICAL EXAMINATION AT A HEALTH CARE FACILITY: Primary | ICD-10-CM

## 2025-03-25 PROCEDURE — 1170F FXNL STATUS ASSESSED: CPT | Performed by: FAMILY MEDICINE

## 2025-03-25 PROCEDURE — 3078F DIAST BP <80 MM HG: CPT | Performed by: FAMILY MEDICINE

## 2025-03-25 PROCEDURE — 3075F SYST BP GE 130 - 139MM HG: CPT | Performed by: FAMILY MEDICINE

## 2025-03-25 PROCEDURE — 1159F MED LIST DOCD IN RCRD: CPT | Performed by: FAMILY MEDICINE

## 2025-03-25 PROCEDURE — 99397 PER PM REEVAL EST PAT 65+ YR: CPT | Performed by: FAMILY MEDICINE

## 2025-03-25 PROCEDURE — 1124F ACP DISCUSS-NO DSCNMKR DOCD: CPT | Performed by: FAMILY MEDICINE

## 2025-03-25 PROCEDURE — 3008F BODY MASS INDEX DOCD: CPT | Performed by: FAMILY MEDICINE

## 2025-03-25 ASSESSMENT — ACTIVITIES OF DAILY LIVING (ADL)
TAKING_MEDICATION: INDEPENDENT
BATHING: INDEPENDENT
MANAGING_FINANCES: INDEPENDENT
DRESSING: INDEPENDENT
GROCERY_SHOPPING: INDEPENDENT
DOING_HOUSEWORK: INDEPENDENT

## 2025-03-25 ASSESSMENT — ENCOUNTER SYMPTOMS
DIZZINESS: 0
UNEXPECTED WEIGHT CHANGE: 0
FEVER: 0
NERVOUS/ANXIOUS: 0
NAUSEA: 0
HEADACHES: 0
SHORTNESS OF BREATH: 0
POLYDIPSIA: 0
CONSTIPATION: 0
CHILLS: 0
SLEEP DISTURBANCE: 0
VOMITING: 0
ACTIVITY CHANGE: 0
DIARRHEA: 0
FATIGUE: 0

## 2025-03-25 NOTE — PROGRESS NOTES
"Subjective   Patient ID: Macie Groves is a 69 y.o. female who presents for Annual Exam.    Adult Health Exam   - reports she has generally been doing well, no major concerns   - has been trying to eat well, and has been trying to stay active as much as possible   - does deal with chronic back pain issues, and has been dealing with them   - sleeps well, has no problems falling asleep or staying asleep   - does feel well rested after a good nights sleep, occasionally will nap   - does follow with pulmonologist for lung nodules   - no major changes to the personal health over the last year   - non-smoker, former smoker but quit 30 years ago, does not drink alcohol, no other drug use   - working with physical therapy to rehabilitate the shoulder   - does follow with OBGYN for PAP and mammogram   - last colonoscopy done in 2023        Review of Systems   Constitutional:  Negative for activity change, chills, fatigue, fever and unexpected weight change.   Eyes:  Negative for visual disturbance.   Respiratory:  Negative for shortness of breath.    Cardiovascular:  Negative for chest pain.   Gastrointestinal:  Negative for constipation, diarrhea, nausea and vomiting.   Endocrine: Negative for polydipsia and polyuria.   Neurological:  Negative for dizziness and headaches.   Psychiatric/Behavioral:  Negative for sleep disturbance. The patient is not nervous/anxious.        Objective   /62   Pulse 78   Ht 1.6 m (5' 3\")   Wt 57.2 kg (126 lb)   BMI 22.32 kg/m²     Physical Exam  Constitutional:       Appearance: Normal appearance.   HENT:      Right Ear: Tympanic membrane normal.      Left Ear: Tympanic membrane normal.      Nose: Nose normal.      Mouth/Throat:      Mouth: Mucous membranes are moist.      Pharynx: Oropharynx is clear.   Eyes:      Pupils: Pupils are equal, round, and reactive to light.   Cardiovascular:      Rate and Rhythm: Normal rate and regular rhythm.   Pulmonary:      Effort: Pulmonary " effort is normal. No respiratory distress.      Breath sounds: Normal breath sounds.   Abdominal:      General: Abdomen is flat. Bowel sounds are normal.   Musculoskeletal:         General: No swelling or tenderness. Normal range of motion.      Cervical back: Normal range of motion.   Skin:     General: Skin is warm.      Capillary Refill: Capillary refill takes less than 2 seconds.   Neurological:      General: No focal deficit present.      Mental Status: She is alert and oriented to person, place, and time. Mental status is at baseline.   Psychiatric:         Mood and Affect: Mood normal.         Thought Content: Thought content normal.         Assessment/Plan   Problem List Items Addressed This Visit    None  Visit Diagnoses         Codes    Routine general medical examination at a health care facility    -  Primary Z00.00    Relevant Orders    CBC and Auto Differential    Comprehensive metabolic panel    Lipid panel    Hemoglobin A1c    Vitamin D 25-Hydroxy,Total (for eval of Vitamin D levels)                minutes on the discharge service.

## 2025-03-25 NOTE — LETTER
Macie Groves  1955    3/25/2025    To Whom This May Concern:    My patient, Macie Groves, is unable to perform Jury Duty due to a mental or physical condition that renders the prospective juror unfit for jury service for the remainder of the jury year.    Should you have any questions please do not hesitate to call.    Thank you for your cooperation.    Sincerely,    Alfa Ho MD

## 2025-03-30 PROBLEM — J45.20 MILD INTERMITTENT ASTHMA WITHOUT COMPLICATION (HHS-HCC): Status: ACTIVE | Noted: 2019-09-25

## 2025-03-30 PROBLEM — M75.02 ADHESIVE CAPSULITIS OF LEFT SHOULDER: Status: ACTIVE | Noted: 2025-02-14

## 2025-03-30 PROBLEM — M62.838 SPASM OF MUSCLE: Status: RESOLVED | Noted: 2024-04-10 | Resolved: 2025-03-30

## 2025-03-30 PROBLEM — M75.122 NONTRAUMATIC COMPLETE TEAR OF LEFT ROTATOR CUFF: Status: ACTIVE | Noted: 2025-03-30

## 2025-03-31 ENCOUNTER — APPOINTMENT (OUTPATIENT)
Dept: RHEUMATOLOGY | Facility: CLINIC | Age: 70
End: 2025-03-31
Payer: COMMERCIAL

## 2025-03-31 VITALS
HEART RATE: 69 BPM | SYSTOLIC BLOOD PRESSURE: 141 MMHG | OXYGEN SATURATION: 99 % | HEIGHT: 63 IN | TEMPERATURE: 97.6 F | DIASTOLIC BLOOD PRESSURE: 64 MMHG | WEIGHT: 126.8 LBS | BODY MASS INDEX: 22.47 KG/M2

## 2025-03-31 DIAGNOSIS — I73.00 RAYNAUD'S DISEASE WITHOUT GANGRENE: Primary | ICD-10-CM

## 2025-03-31 DIAGNOSIS — J45.20 MILD INTERMITTENT ASTHMA WITHOUT COMPLICATION (HHS-HCC): ICD-10-CM

## 2025-03-31 DIAGNOSIS — K12.1 ORAL ULCER: ICD-10-CM

## 2025-03-31 PROCEDURE — 1123F ACP DISCUSS/DSCN MKR DOCD: CPT | Performed by: INTERNAL MEDICINE

## 2025-03-31 PROCEDURE — 3077F SYST BP >= 140 MM HG: CPT | Performed by: INTERNAL MEDICINE

## 2025-03-31 PROCEDURE — 3008F BODY MASS INDEX DOCD: CPT | Performed by: INTERNAL MEDICINE

## 2025-03-31 PROCEDURE — 1036F TOBACCO NON-USER: CPT | Performed by: INTERNAL MEDICINE

## 2025-03-31 PROCEDURE — 99214 OFFICE O/P EST MOD 30 MIN: CPT | Performed by: INTERNAL MEDICINE

## 2025-03-31 PROCEDURE — 1160F RVW MEDS BY RX/DR IN RCRD: CPT | Performed by: INTERNAL MEDICINE

## 2025-03-31 PROCEDURE — 3078F DIAST BP <80 MM HG: CPT | Performed by: INTERNAL MEDICINE

## 2025-03-31 PROCEDURE — 1159F MED LIST DOCD IN RCRD: CPT | Performed by: INTERNAL MEDICINE

## 2025-03-31 PROCEDURE — 1158F ADVNC CARE PLAN TLK DOCD: CPT | Performed by: INTERNAL MEDICINE

## 2025-03-31 RX ORDER — AMOXICILLIN 500 MG/1
500 CAPSULE ORAL EVERY 8 HOURS SCHEDULED
Qty: 30 CAPSULE | Refills: 0 | Status: SHIPPED | OUTPATIENT
Start: 2025-03-31 | End: 2025-04-10

## 2025-03-31 ASSESSMENT — ENCOUNTER SYMPTOMS
PSYCHIATRIC NEGATIVE: 1
ARTHRALGIAS: 1
ENDOCRINE NEGATIVE: 1
GASTROINTESTINAL NEGATIVE: 1
COUGH: 0
FEVER: 0
SHORTNESS OF BREATH: 0
MYALGIAS: 0
JOINT SWELLING: 0
BACK PAIN: 0
FATIGUE: 0
COLOR CHANGE: 0
NUMBNESS: 0
EYES NEGATIVE: 1
WEAKNESS: 0

## 2025-03-31 NOTE — PROGRESS NOTES
[facility] RHEUMATOLOGY     AND INTERNAL MEDICINE    RHEUMATOLOGY PROGRESS NOTE    Macie Groves 69 y.o. female  :  1955  PCP:  Alfa Ho MD    Chief Complaint   Patient presents with    Raynauds       SUBJECTIVE  Pt reports she is doing well with the Raynaud's    Despite the cold winter, things were not that bad.    No other symptoms to suggest another autoimmune disease  Is seeing ortho for adhesive capsulitis and partial rotator cuff tear.  Is doing PT.  Will not need surgery  Hasn't had a flare of lichen planus but gets recurrent yeast infections  Pt noted on painful bump on her tongue and now it is getting infected      Records since last seen reviewed in [facility], [facility] and [facility] Record  Patient Active Problem List    Diagnosis Date Noted    Adhesive capsulitis of left shoulder 2025    Allergic rhinitis 2024    Oral lichen planus 2024    Ocular migraine 2024    Familial combined hyperlipidemia 2024    Elevated alkaline phosphatase level 2024    Hyperglycemia 2024    JESE (stress urinary incontinence, female) 2024    HTN (hypertension), benign 2024    Anxiety and depression 2024    Raynaud's disease without gangrene 2024    Vitamin D deficiency 2024    Mild intermittent asthma without complication ([facility]) 2019     Past Medical History:   Diagnosis Date    Acute non-recurrent maxillary sinusitis 2024    Allergy, unspecified, initial encounter 2022    Allergic reaction, initial encounter    Caffeine use     Other allergic rhinitis 2020    Non-seasonal allergic rhinitis, unspecified trigger    Ovarian mass, right 2024    Partial intestinal obstruction, unspecified as to cause (Multi)     Partial small bowel obstruction    Sacroiliac joint dysfunction of right side 2020    Subacute cough 2024     Current Outpatient  Medications on File Prior to Visit   Medication Sig Dispense Refill    albuterol 90 mcg/actuation inhaler Inhale 2 puffs every 4 hours if needed.      biotin 5 mg capsule 1 capsule (5 mg) once daily.      celecoxib (CeleBREX) 200 mg capsule 1 capsule (200 mg). PRN      cholecalciferol (Vitamin D-3) 5,000 Units tablet Take 1 tablet (5,000 Units) by mouth once daily.      diclofenac sodium (Voltaren) 1 % gel Apply 2.25 inches (2 g) topically every 8 hours if needed.      escitalopram (Lexapro) 5 mg tablet Take 1 tablet (5 mg) by mouth once daily.      fluconazole (Diflucan) 200 mg tablet Take 1 tablet (200 mg) by mouth. 2x a week      fluorouracil (Efudex) 5 % cream PRN      nystatin-triamcinolone (Mycolog II) ointment PRN      tretinoin (Retin-A) 0.025 % gel APPLY TOPICALLY TO THE AFFECTED AREA EVERY NIGHT AT BEDTIME AS DIRECTED       No current facility-administered medications on file prior to visit.     Allergies   Allergen Reactions    Ciprofloxacin Other     Dizziness    Ezetimibe Other     Generic Zetia causes muscle aches    Prednisone Hives    Methocarbamol Itching and Rash     Social History     Tobacco Use    Smoking status: Former     Current packs/day: 0.00     Types: Cigarettes     Quit date:      Years since quittin.2    Smokeless tobacco: Never   Vaping Use    Vaping status: Never Used   Substance Use Topics    Alcohol use: Never    Drug use: Never     Review of Systems   Constitutional:  Negative for fatigue and fever.   HENT:  Positive for mouth sores.    Eyes: Negative.    Respiratory:  Negative for cough and shortness of breath.    Cardiovascular:  Negative for leg swelling.   Gastrointestinal: Negative.    Endocrine: Negative.    Genitourinary: Negative.    Musculoskeletal:  Positive for arthralgias. Negative for back pain, gait problem, joint swelling and myalgias.   Skin:  Negative for color change and rash.   Neurological:  Negative for weakness and numbness.   Psychiatric/Behavioral:  "Negative.         PHYSICAL EXAM  /64   Pulse 69   Temp 36.4 °C (97.6 °F)   Ht 1.6 m (5' 3\")   Wt 57.5 kg (126 lb 12.8 oz)   SpO2 99%   BMI 22.46 kg/m²   Depression: Not at risk (3/31/2025)    PHQ-2     PHQ-2 Score: 0     Physical Exam  Vitals reviewed.   Constitutional:       General: She is not in acute distress.     Appearance: Normal appearance.   HENT:      Head: Normocephalic and atraumatic.      Mouth/Throat:      Mouth: Oral lesions (inflamed papilla on R side of tongue) present.   Eyes:      General: No scleral icterus.     Extraocular Movements: Extraocular movements intact.      Conjunctiva/sclera: Conjunctivae normal.      Pupils: Pupils are equal, round, and reactive to light.   Pulmonary:      Effort: Pulmonary effort is normal. No respiratory distress.   Musculoskeletal:         General: No swelling, tenderness or deformity.      Cervical back: Normal range of motion and neck supple. No tenderness.      Right lower leg: No edema.      Left lower leg: No edema.      Comments: No synovitis of examined joints   Skin:     Coloration: Skin is not pale.      Findings: No erythema or rash.   Neurological:      General: No focal deficit present.      Mental Status: She is alert and oriented to person, place, and time. Mental status is at baseline.      Gait: Gait normal.   Psychiatric:         Mood and Affect: Mood normal.           Health Maintenance Due   Topic Date Due    Bone Density Scan  Never done    COVID-19 Vaccine (1) Never done    Hepatitis C Screening  Never done    RSV High Risk: (Elderly (60+) or Pregnant Population) (1 - Risk 60-74 years 1-dose series) Never done    Pneumococcal Vaccine (3 of 3 - PCV20 or PCV21) 01/03/2018    Yearly Adult Physical  03/22/2025    Mammogram  05/20/2025       Assessment/plan  Assessment & Plan  Raynaud's disease without gangrene  Stable without signs of disease progression on exam.  Labs ordered today to monitor for transition to secondary autoimmune " disease--doubtful    Orders:    C-Reactive Protein; Future    Sedimentation Rate; Future    AMOL with Reflex to NATHALIE; Future    Follow Up In Rheumatology; Future    Mild intermittent asthma without complication ([facility])  No clinical signs of disease progression since last visit.  No increase in symptoms  Recent labs/imaging/procedures reviewed  Medical records reviewed  Disease process and pertinent med refills are managed by  PCP.    I reviewed that no conflict in medications with the current condition I am treating           Oral ulcer  Amox [id number] prescribed for relief  Orders:    amoxicillin (Amoxil) 500 mg capsule; Take 1 capsule (500 mg) by mouth every 8 hours for 10 days.      Follow up: ___12__months, sooner if change in symptoms    Immunization History   Administered Date(s) Administered    COVID-19, mRNA, LNP-S, PF, 30 mcg/0.3 mL dose 02/17/2021, 03/09/2021, 10/12/2021    Flu vaccine (IIV4), preservative free *Check age/dose* 11/01/2017, 11/01/2019, 10/01/2020    Flu vaccine, quadrivalent, high-dose, preservative free, age 65y+ (FLUZONE) 11/30/2021, 09/15/2023    Flu vaccine, quadrivalent, no egg protein, age 6 month or greater (FLUCELVAX) 10/20/2020    Flu vaccine, trivalent, preservative free, HIGH-DOSE, age 65y+ (Fluzone) 09/15/2023, 11/18/2024    Hepatitis B vaccine, 19 yrs and under (RECOMBIVAX, ENGERIX) 08/02/1998, 09/24/1998, 04/15/1999    Influenza, Unspecified 11/04/2015, 11/01/2016    Influenza, seasonal, injectable 11/04/2008    Novel influenza-H1N1-09, preservative-free 10/26/2009    Pneumococcal conjugate vaccine, 13-valent (PREVNAR 13) 01/03/2013    Pneumococcal polysaccharide vaccine, 23-valent, age 2 years and older (PNEUMOVAX 23) 02/11/2008    Td vaccine, age 7 years and older (TENIVAC) 11/01/2005    Tdap vaccine, age 7 year and older (BOOSTRIX, ADACEL) 03/25/2011, 07/30/2017    Zoster vaccine, recombinant, adult (SHINGRIX) 03/25/2024, 07/01/2024    Zoster, live 01/13/2012

## 2025-03-31 NOTE — ASSESSMENT & PLAN NOTE
Stable without signs of disease progression on exam.  Labs ordered today to monitor for transition to secondary autoimmune disease--doubtful    Orders:    C-Reactive Protein; Future    Sedimentation Rate; Future    AMOL with Reflex to NATHALIE; Future    Follow Up In Rheumatology; Future

## 2025-03-31 NOTE — ASSESSMENT & PLAN NOTE
No clinical signs of disease progression since last visit.  No increase in symptoms  Recent labs/imaging/procedures reviewed  Medical records reviewed  Disease process and pertinent med refills are managed by  PCP.    I reviewed that no conflict in medications with the current condition I am treating

## 2025-03-31 NOTE — PATIENT INSTRUCTIONS
It was a pleasure to see you today  Please call if your symptoms worsen  Please review your summary for education and reminders.  Follow up at your next appointment.    If you had labs/xrays done today, you will be able to view on [employer].   We will contact you when the results are reviewed for further discussion.  Please note that you may receive your results before I have had a chance to review.  Please know I will be contacting you for discussion  Homegoing instructions for all patient  A healthy lifestyle helps chronic diseases  These are all the goals you should strive to improve your overall health   Blood pressure <130/85   BMI of <30 or waist circumference that is 1/2 of your height   Fasting blood sugar <107 (if you are diabetic, aim for an A1c <6.4%_   LDL cholesterol <130   Avoid smoking   Manage your stress   Get your preventive exams   Get your immunizations

## 2025-04-02 LAB
ANA PAT SER IF-IMP: ABNORMAL
ANA SER QL IF: POSITIVE
ANA TITR SER IF: ABNORMAL TITER
CENTROMERE B AB SER-ACNC: ABNORMAL AI
CRP SERPL-MCNC: <3 MG/L
DSDNA AB SER-ACNC: <1 IU/ML
ENA JO1 AB SER IA-ACNC: ABNORMAL AI
ENA RNP AB SER-ACNC: ABNORMAL AI
ENA SCL70 AB SER IA-ACNC: ABNORMAL AI
ENA SM AB SER IA-ACNC: ABNORMAL AI
ENA SM+RNP AB SER IA-ACNC: ABNORMAL AI
ENA SS-A AB SER IA-ACNC: ABNORMAL AI
ENA SS-B AB SER IA-ACNC: ABNORMAL AI
ERYTHROCYTE [SEDIMENTATION RATE] IN BLOOD BY WESTERGREN METHOD: 2 MM/H
LABORATORY COMMENT REPORT: ABNORMAL
NUCLEOSOME AB SER IA-ACNC: ABNORMAL AI
RIBOSOMAL P AB SER-ACNC: ABNORMAL AI

## 2025-04-03 NOTE — RESULT ENCOUNTER NOTE
*mychart message sent*  Your labs are stable and no signs of another disease process evolving.  Great to see.  Let me know if you notice any new symptoms before your next appointment with me in a year especially if you have concerns

## 2025-06-10 LAB
25(OH)D3+25(OH)D2 SERPL-MCNC: 70 NG/ML (ref 30–100)
ALBUMIN SERPL-MCNC: 4 G/DL (ref 3.6–5.1)
ALP SERPL-CCNC: 86 U/L (ref 37–153)
ALT SERPL-CCNC: 11 U/L (ref 6–29)
ANION GAP SERPL CALCULATED.4IONS-SCNC: 7 MMOL/L (CALC) (ref 7–17)
AST SERPL-CCNC: 20 U/L (ref 10–35)
BASOPHILS # BLD AUTO: 50 CELLS/UL (ref 0–200)
BASOPHILS NFR BLD AUTO: 0.9 %
BILIRUB SERPL-MCNC: 0.3 MG/DL (ref 0.2–1.2)
BUN SERPL-MCNC: 15 MG/DL (ref 7–25)
CALCIUM SERPL-MCNC: 8.9 MG/DL (ref 8.6–10.4)
CHLORIDE SERPL-SCNC: 102 MMOL/L (ref 98–110)
CHOLEST SERPL-MCNC: 232 MG/DL
CHOLEST/HDLC SERPL: 3.3 (CALC)
CO2 SERPL-SCNC: 28 MMOL/L (ref 20–32)
CREAT SERPL-MCNC: 0.58 MG/DL (ref 0.5–1.05)
EGFRCR SERPLBLD CKD-EPI 2021: 98 ML/MIN/1.73M2
EOSINOPHIL # BLD AUTO: 78 CELLS/UL (ref 15–500)
EOSINOPHIL NFR BLD AUTO: 1.4 %
ERYTHROCYTE [DISTWIDTH] IN BLOOD BY AUTOMATED COUNT: 12.2 % (ref 11–15)
EST. AVERAGE GLUCOSE BLD GHB EST-MCNC: 117 MG/DL
EST. AVERAGE GLUCOSE BLD GHB EST-SCNC: 6.5 MMOL/L
GLUCOSE SERPL-MCNC: 90 MG/DL (ref 65–99)
HBA1C MFR BLD: 5.7 %
HCT VFR BLD AUTO: 40.1 % (ref 35–45)
HDLC SERPL-MCNC: 70 MG/DL
HGB BLD-MCNC: 13.1 G/DL (ref 11.7–15.5)
LDLC SERPL CALC-MCNC: 146 MG/DL (CALC)
LYMPHOCYTES # BLD AUTO: 1534 CELLS/UL (ref 850–3900)
LYMPHOCYTES NFR BLD AUTO: 27.4 %
MCH RBC QN AUTO: 31.7 PG (ref 27–33)
MCHC RBC AUTO-ENTMCNC: 32.7 G/DL (ref 32–36)
MCV RBC AUTO: 97.1 FL (ref 80–100)
MONOCYTES # BLD AUTO: 560 CELLS/UL (ref 200–950)
MONOCYTES NFR BLD AUTO: 10 %
NEUTROPHILS # BLD AUTO: 3377 CELLS/UL (ref 1500–7800)
NEUTROPHILS NFR BLD AUTO: 60.3 %
NONHDLC SERPL-MCNC: 162 MG/DL (CALC)
PLATELET # BLD AUTO: 254 THOUSAND/UL (ref 140–400)
PMV BLD REES-ECKER: 10.3 FL (ref 7.5–12.5)
POTASSIUM SERPL-SCNC: 4.4 MMOL/L (ref 3.5–5.3)
PROT SERPL-MCNC: 7 G/DL (ref 6.1–8.1)
RBC # BLD AUTO: 4.13 MILLION/UL (ref 3.8–5.1)
SODIUM SERPL-SCNC: 137 MMOL/L (ref 135–146)
TRIGL SERPL-MCNC: 65 MG/DL
WBC # BLD AUTO: 5.6 THOUSAND/UL (ref 3.8–10.8)

## 2025-09-25 ENCOUNTER — APPOINTMENT (OUTPATIENT)
Dept: PRIMARY CARE | Facility: CLINIC | Age: 70
End: 2025-09-25
Payer: COMMERCIAL

## 2026-04-08 ENCOUNTER — APPOINTMENT (OUTPATIENT)
Dept: RHEUMATOLOGY | Facility: CLINIC | Age: 71
End: 2026-04-08
Payer: COMMERCIAL